# Patient Record
Sex: FEMALE | Race: WHITE
[De-identification: names, ages, dates, MRNs, and addresses within clinical notes are randomized per-mention and may not be internally consistent; named-entity substitution may affect disease eponyms.]

---

## 2021-01-25 ENCOUNTER — HOSPITAL ENCOUNTER (INPATIENT)
Dept: HOSPITAL 46 - MS | Age: 76
LOS: 17 days | Discharge: NURSING, REHAB OR LONG-TERM CARE | DRG: 470 | End: 2021-02-11
Attending: SPECIALIST | Admitting: SPECIALIST
Payer: MEDICARE

## 2021-01-25 VITALS — HEIGHT: 63 IN | WEIGHT: 134.48 LBS | BODY MASS INDEX: 23.83 KG/M2

## 2021-01-25 DIAGNOSIS — Z79.1: ICD-10-CM

## 2021-01-25 DIAGNOSIS — M16.11: Primary | ICD-10-CM

## 2021-01-25 DIAGNOSIS — Z79.899: ICD-10-CM

## 2021-01-25 DIAGNOSIS — I10: ICD-10-CM

## 2021-01-25 DIAGNOSIS — F17.210: ICD-10-CM

## 2021-01-25 DIAGNOSIS — G62.9: ICD-10-CM

## 2021-01-25 DIAGNOSIS — Z79.891: ICD-10-CM

## 2021-01-25 PROCEDURE — C1776 JOINT DEVICE (IMPLANTABLE): HCPCS

## 2021-01-25 PROCEDURE — C1713 ANCHOR/SCREW BN/BN,TIS/BN: HCPCS

## 2021-01-27 NOTE — NUR
DOS:02/08/21
HAS 1 STEP INTO THE HOME
WALK IN SHOWER  AND CAN GET A SHOWER CHAIR
WILL GET A FWW
HAS THE [facility]
HER  WILL GET HER TO PHYSICAL THERAPY APPOINTMENTS AND THE [employer] [address]
WILL BRING HER TO DR BEARD APPOINTMENTS.
PT IS CONCERNED THAT SHE IS NOT GOING TO DO WELL AT HOME. THING SHE MAY NEED
TO GO TO THE [facility] FOR MORE REHAB.

## 2021-01-28 NOTE — NUR
PT CALLED WRITER THIS AM AND ASKED IF SHE COULD RECEIVE HER 1ST COVID
INJECTION THIS WEEK. DR BEARD NOTIFIED AND SAID NO. PHONE CALL TO PATIENT AND
EXPLAINED THAT SHE NEEDS TO WAIT TO RECEIVE HER COVID VINJECTION. PT WAS OKAY
WITH THIS. SHE WANTS HER JOINT FIX.

## 2021-02-08 PROCEDURE — 8E0YXBZ COMPUTER ASSISTED PROCEDURE OF LOWER EXTREMITY: ICD-10-PCS | Performed by: SPECIALIST

## 2021-02-08 PROCEDURE — 0SR904Z REPLACEMENT OF RIGHT HIP JOINT WITH CERAMIC ON POLYETHYLENE SYNTHETIC SUBSTITUTE, OPEN APPROACH: ICD-10-PCS | Performed by: SPECIALIST

## 2021-02-08 NOTE — NUR
THIS RN IN TO ASSESS PT AND ADMINISTER ORDERED MEDICATIONS. PT LAYING IN BED
SLEEPING. PT AWAKES EASILY AND STATES THAT SHE IS STILL HAS 8/10 RIGHT HIP
PAIN. ORDERED IV KETOROLAC ADMINISTERED ALONG WITH OTHER MEDICATIONS (SEE
MAR). PT ALSO PLACED ON 2L O2 NC DUE TO PT SPO2 AT 86% WHILE SLEEPING. VS
STABLE, DRESSING INTACT AND C/D/I. SCDS, AND CRYO CUFF IN PLACE. PT REPORTS NO
FURTHER NEEDS WHEN ASKED. CALLL LIGHT IN REACH, BED IN LOWEST POSITION, WILL
CONTINUE PLAN OF CARE.

## 2021-02-08 NOTE — NUR
Called and spoke with Day from [facility].  They are
accepting patients.  She request face sheet and any notes to check for
payment.  Face sheet, surgery report, and H&P faxed to [place].

## 2021-02-08 NOTE — NUR
PATIENT IN BED RESTING. PT IN ROOM TO TALK TO PATIENT. CRYO FILLED. CALL LIGHT
IN REACH. NO FURTHER NEEDS AT THIS TIME.

## 2021-02-08 NOTE — NUR
ASSESSMENT COMPLETE. PT LAYING IN BED LEG ELEVATED. SCHEDULED MEDICATION
ADMINISTERED. PT REPORT PAIN LEVELS AS 10/10 SINCE BLOCK WAS IN PLACE. PT
ADMITS THAT CALF UP IN NUMB AND THE BLOCK HELPED BUT STILL EXPERIENCING PAIN
THAT IS UNMANAGABLE WITH PAIN REGIMEN. PRN PAIN MEDICATION CAN NOT BE
GIVEN FOR AN HR. PT NOTIFIED.SCD'S IN PLACE, CYROCUFF IN PLACEAND CHECKED
 DRESSINGS CLEAN DRY AND INTACT. CALL LIGHT WITHIN REACH

## 2021-02-08 NOTE — NUR
PT TO FLOOR VIA BED WITH GOOD FROST. PT AWAKE AND DENIES PAIN OR NAUSEA. PT CAN
WIGGLE TOES AND HAS SENSATION TO ANKLE. SCDS IN PLACE. DRESSING CDI X2. GIVEN
WATER AND JELLO. CALL LIGHT IN REACH. VS STABLE.

## 2021-02-08 NOTE — NUR
Pt states she lives in [place] with her spouse in an apartment.
Spouse will assist her as needed.  Pt states she has pain, different
from her last hip replacement.  She initially thought she would go
home with home health, but now feels she will need placement.  First
choice would be to go to [facility] program.
Second choice is [facility] in [place].  Informed I will contact
[place] to see if they are accepting patients.  Will follow up with pt
tomorrow.

## 2021-02-08 NOTE — NUR
PT LAYING IN BED. HEAD ELEVATED TO 15 DEGREES. LIGHT TURNED DOWN. PT REPORTS
NO PAIN IF SHE STAY STILL BUT IF SHE MOVED PAIN IS 8/10. NO CONCERNS. CALL
LIGHT WITHIN REACH.

## 2021-02-08 NOTE — NUR
SCHEDULE PAIN MEDICATION GIVEN. ASSISTED PT UP TO THE BATHROOM. 1-2 PERSON
ASSIST W WALKER. BED MADE. ASSISTED PT BACK TO BED. CRYOCUFF IN PLACE, FOOT
AIR CUFFS REPLACED. PT REPORTS PAIN AS A 8/10.  PT RESTING IN BED, BED
ELEVATED TO 15 . PILLOW PLACED NEXT TO HIP. NO OTHER CONCERNS. CALL LIGHT
WITHIN REACH

## 2021-02-08 NOTE — NUR
PT RECIEVED A BLOCK FROM BLAYNE ZIMMER. REPORTED PAIN AT A 10/10 BEFORE
PROCEDURE. AFTER PROCEDURE PT REPORTS PAIN AS A 7/10. PT EATING LUNCH. HEAD
ELEVATED, CRYOCUFF REPLACED. SPIKA IN PLACED. [facility], FOOT PUMPS IN PLACE
AND ON. PT REQUESTED COFFEE.PT REPOSITIONED. NO OTHER CONCERNS. CALL LIGHT
WITHIN REACH.

## 2021-02-08 NOTE — NUR
PT REPORTS PAIN OF 10/10 ON RT GLUTIAL MUSCLE. REPORTS DEEP NOT SURFACE PAIN.
PRN PAIN MEDICATION ADMINISTERED. PT REPOSITIONED. CALL LIGHT WITHIN REACH.

## 2021-02-08 NOTE — NUR
REPORT RECEIVED FROM HELEN FUNK. PT LAYING IN BED WHEN ASKED ABOUT PAIN PT
STATES SHE'S AT AN 8/10 ON HER R HIP. PT STATES SHE IS STILL ABLE TO RELAX.
CRYO, SCD'S, COMPRESSION STOCKINGS IN PLACE. SPICA DRESSING ON AND IS C/D/I
WITH NO SIGNS OF BLEEDING. CRYO CUFF REFILLED AT THIS TIME. PHONE CALL TO
[facility]IS[facility] BY [facility]. NEW ORDERS PLACED. WILL CONTINUE PLAN OF CARE.

## 2021-02-08 NOTE — NUR
CALLED DR BEARD REGARDING NEWLY NOTED SWELLING AT INCSION SITE. DRESSING
OTHERWISE CLEAN AND DRY. ORDERED A SPICA DRESSING. GIVEN OXY AND PLACED SPICA
DRESSING WO DIFFICULTY. PT RATES PAIN 10\10 AND STATES THAT HER LAST HIP DID
NOT HURT LIKE THIS. CRYO BACK IN PLACE.

## 2021-02-08 NOTE — NUR
THIS RN IN TO ADMINISTER ORDERED IV ABX AND KETOROLAC. PT SLEEPING IN BED AND
WOKE EASILY. PT IN NO APPARENT DISTRESS BUT RATES HER PAIN AT A 7/10. WHEN
ASKED IF PT IS ABLE TO RELAX AND REST PT STATES YES. PT STATES PREVIOUS PAIN
MEDICATIONS HELPED WITH THE PAIN. PT STILL ON 2L O2 NC, SPO2 AT 94%. PT
REPORTS NO FURTHER NEEDS WHEN ASKED IV ABX INFUSING. CALL LIGHT IN REACH, BED
IN LOWEST POSITION, WILL CONTINUE PLAN OF CARE.

## 2021-02-08 NOTE — NUR
THIS RN IN TO PT'S ROOM TO ADMINISTER ORDERED PAIN MEDICATIONS. PT SLEEPING IN
BED AND AWOKE EASILY. WHEN ASKED TO RATED HER PAIN PT STATED IT WAS AT A 7/10.
PT ALSO GIVEN PRN OXYCODONE ALONG WITH SCHEDULED PAIN MEDICATIONS AT THIS
TIME. PT REPORTS NO DIFFICULTY BREATHING, SPO2 AT 95%, PT STILL ON 2L O2 NC.
PT REPORTS NO FURTHER NEEDS WHEN ASKED, WILL CONTINUE PLAN OF CARE. CALL LIGHT
IN REACH, BED IN LOWEST POSITION.

## 2021-02-08 NOTE — NUR
DR BEARD CALLED AND UPDATED ON PTS POOR PAIN MANAGEMENT. NEW ORDERS RECIEVED.
READ BACK FOR VERIFICATION. [facility] SHIFT NURSES NOTIFIED.

## 2021-02-09 NOTE — NUR
PT ASSISTED TO CHAIR FOR DINNER. PICTURE ON LEFT HAND SKIN TEAR TAKEN. CONCENT
SIGNED AND PLACED IN CHART.

## 2021-02-09 NOTE — NUR
THIS RN IN TO ADMINISTER ORDERED MEDICATIONS. PT AWAKE AND ALERT AND HAD JUST
HAD HER LABS DRAWN. PT REPORTS A 7/10 PAIN. 5MG PRN OXYCODONE GIVEN ALONG WITH
PTS SCHEDULED PAIN MEDICATIONS. CRYO CUFF REFILLED WITH ICE AT THIS TIME BY [facility] CU[facility]. PT REPORTS NO FURTHER NEEDS AT THIS TIME AND STATES SHE WILL BE
WATCHING TV. CALL LIGHT WITHIN REACH, BED IN LOWEST POSITION, WILL CONTINUE
PLAN OF CARE.

## 2021-02-09 NOTE — NUR
THIS RN IN TO ADMINISTER ORDERED MEDICATIONS. PT AWAKE AND ALERT WATCHING TV.
PT STATES THAT HER PAIN RIGHT HIP/LEG PAIN HAS IMPROVED AND IS NOW AT A 4/10.
PT GIVEN SCHEDULED MEDICATIONS. PT REPORTS NO FURTHER NEEDS WHEN ASKED, WILL
CONTINUE PLAN OF CARE. CALL LIGHT IN REACH, BED IN LOWEST POSITION, WILL
CONTINUE PLAN OF CARE.

## 2021-02-09 NOTE — NUR
PT FELL EARLIER THIS AFTERNOON. THIS RN WAS DOWN THE JAMA AND HEARD CALL LIGHT
GO OFF AND SOMEONE YELL. RAN TO ROOM AND PT WAS ON FLOOR IN BATHROOM. [facility] N[employer] AND STUDENT RN LESTER IN ROOM. PT STATED HER ANKLE AND OPERATIVE HIP
WERE THE ONLY THINGS HURTING. SMALL SKIN TEAR RIGHT HAND. VS ASSESSED. NO
DEFORMITLY NOTICED. OBTAINED HOVER AND LAINA MATS. MOVED PT TO BED WO
DIFFICULTY. PT THEN STATED THAT IT FELT BETTER ONCE SHE SETTLED DOWN IN THE
BED. STATED SHE WAS MOSTLY SCARED. VS REASSESSED AND CYRIL CALLED KISHA AND
ORDERED AN XRAY. IRIS PLACED.

## 2021-02-09 NOTE — NUR
IV ABX COMPLETE, PT SALINE LOCKED. PT STILL REPORTS 7/10 PAIN BUT STATES HER
PAIN MEDICATIONS HAVE HELPED WITH HER COMFORT. PT REPORTS NO FURTHER NEEDS
WHEN ASKED AND IS WATCHING TV. CALL LIGHT IN REACH, BED IN LOWEST POSITION,
WILL CONTINUE PLAN OF CARE.

## 2021-02-09 NOTE — NUR
REPORT RECEIVED. PT LYING IN BED WITH EYES CLOSED. RR EQUAL AND NON-LABORED.
CRYOCUFF, TEDHOSE, FEET PUMPS ALL IN PLACE. SALINE LOCK. CALL LIGHT IN REACH.

## 2021-02-09 NOTE — NUR
ASSESSMENT DONE. TOOK PICTURES OF SKIN TEAR ON PT RT HAND. ASSISTED PT TO
RESTROOM WITH 1 PERSON STAND BY WITH WALKER. PT SITTING IN CHAIR WITH LETGS
ELEVATED. REPORTS PAIN A 6/10. TEB HOSES, AND CYROCUFF IN PLACE. NO CONCERNS
AT THIS TIME. CALL LIGHT WITHIN REACH.

## 2021-02-09 NOTE — NUR
THIS RN IN TO CHECK ON PT. PT SLEEPING IN BED ON 2L NC, SPO2 AT 95%. PT
RESPIRATIONS ARE EVEN AND UNLABORED. PT IN NO APPARENT DISTRESS AT THIS TIME
AND WAS LEFT UNDISTURBED, WILL CONTINUE PLAN OF CARE.

## 2021-02-09 NOTE — NUR
ROUNDED ON PT. PASSED MEDICATIONS WITH STUDENT NURSE. PT ASSISTED TO EAT
BREAKFAST IN CHAIR. PAIN REPORTED AT 7/10. SCHADULED TORADOL ADMINSTERED.
ASSESSMENT COMPLETED. SWELLING NOTED TO RIGHT THIGH. PT REPORTING NUMBNESS TO
RIGHT ANTERIOR THIGH. SPIKA IN PLACE, DRESSING ASSESSED AND IS C/D/I. DR BEARD
TO BEDSIDE FOR ROUNDS. PLAN OF CARE DISCUSSED. TEDHOSE AND CRYOCUFF IN PLACE.

## 2021-02-09 NOTE — NUR
PATIENT SITTING IN CHAIR EATING DINNER. CRYO FILLED. FRESH WATER GIVEN. VITALS
AND I&O'S CHARTED. CALL LIGHT IN REACH. NO FURTHER NEEDS AT THIS TIME.

## 2021-02-09 NOTE — NUR
THIS RN IN TO ASSESS PT AND ADMINISTER SCHEDULED MEDICATIONS. PT INITIALLY
SLEEPING BUT WOKE EASILY. PT WAS ALERT AND ORIENTED. PT HELPED UP TO BSC AS
SHE HAD TO VOID. PT REQUIRED ASSISTANCE TO GET OUT OF BED AS PT STATES HER R
LEG IS STILL "NUMB" FROM THE BLOCK. PT PIVOTED TO BSC WITH 2 PERSON ASSIST AND
ASSISTED BACK ONTO THE BED AS WELL. DRESSING ON R HIP SHOWS NO SIGNS OF
DRAINAGE/BLEEDING AND IS C/D/I. SPICA DRESSING STILL IN PLACE OVER DRESSING.
PEDAL PULSES STRONG BILATERALLY. PT STATES SHE IS COMFORTABLE BUT STILL
REPORTS A 7/10 PAIN. ORDERED TRAMADOL GIVEN. PRN OXYCODONE OFFERED BUT PT
DENIES NEEDING FOR NOW. COMPRESSION STOCKINGS STILL IN PLACE, SCD'S PLACED
BACK ON, PT SALINE LOCKED. PT REPORTS NO FURTHER NEEDS WHEN ASKED AND STATES
SHE WILL BE GOING BACK TO SLEEP. CALL LIGHT ON BED WITHIN REACH, BED IN LOWEST
POSITION, WILL CONTINUE PLAN OF CARE.

## 2021-02-09 NOTE — NUR
PATIENT IN BED. CRYO FILLED. FRESH WATER GIVEN. VITALS AND I&O'S CHARTED. CALL
LIGHT IN REACH. NO FURTHER NEEDS AT THIS TIME.

## 2021-02-09 NOTE — NUR
ASSISTED PT UP TO CHAIR FOR BREAKFAST.
PT REPORTS PAIN AT 7/10. REPORTS FEELING
BETTER TODAY. 1 PERSON STAND BY ASSIST W WALKER. NO CONCERNS CALL LIGHT WITHIN
REACH.

## 2021-02-09 NOTE — NUR
PT BROUGHT STAND BY TO BATHROOM. LEFT ON TOILET ALAONE WITH DOOR CLOSED. PT
TOLD TO PULL CALL LIGHT WHEN DONE. PT REPORTS SHE WAS LEANING FORWARD ON
TOILET AND DID NON STAND UP ALONE. SHE REPORTS HER RIGHT ANKLE TWISTED D/T
STILL BEING NUMB AND SHE FELL OFF TOILET ONTO RIGHT HIP WITH FWW LEG UNDER
RIGHT LEG. PT SCREAMED OUT AND STUDENT NURSE OUTSIDE DOOR WENT IN TO FIND PT
DOWN. CALL LIGHT PULLED AND STAFF RESPONDED. HOVER GABRIEL USED TO GET PT OUT
OFF BATHROOM AND TO LAINA LIFT. PT LIFT UP IN BED. MANUAL BLOOD PRESSURE WHILE
DOWN /90. PT REPORTED PAIN IN RIGHT HIP AND RIGHT ANKLE. PT WAS CRYING
WHILE ON FLOOR FROM FEAR THAT THE FALL HURT HER HIP. EMOTIONAL SUPPORT AND
REASSURANCE PROVIDED. ONCE PT WAS IN BED HER PAIN DECREASED AND SHE DENIED
PAIN IN HER ANKLE. VITALS RECHECK :BP-164/84 HR-70 RR-16 TEMP 98 OXYGEN 98% ON
RA. NEW ABRASION TO RIGHT HAND. NO OTHER VISIBLE INJURIES.
RIGHT HIP X-RAY DONE AND DR BEARD NOTIFIED. DR CAMARA ALSO AWARE. NO OTHER
ORDER FROM DOCTOR.

## 2021-02-09 NOTE — NUR
THIS RN IN TO ADMINISTER ORDERED ABX. PT SLEEPING AND WOKE UP WHEN ENTERING
ROOM. IV ABX STARTED AT THIS TIME. PT REPORTS NO NEEDS AT THIS TIME AND IS NOW
WATCHING TV. CALL LIGHT IN REACH, BED IN LOWEST POSITION, WILL CONTINUE PLAN
OF CARE.

## 2021-02-09 NOTE — NUR
REPORT RECEIVED FROM HELEN RN. PT LAYING IN BED AWAKE AND ALERT. SCDS IN
PLACE, CRYO CUFF CONTAINS ICE AND IS IN PLACE. PT REPORTS NO NEEDS WHEN ASKED.
CALL LIGHT IN REACH, BED IN LOWEST POSITION, WILL CONTINUE PLAN OF CARE.

## 2021-02-09 NOTE — NUR
IN TO ADMINSTER SCHEDULED TRAMADOL AND GABIPENTIN. PT TALKING ON [place] WITH
 SITTING UP IN CHAIR. PT IS REPORTING PAIN AS 7/10. PHYSICAL THERAPY IN
TO WORK WITH PT. JADIELS FURTHER NEEDS.

## 2021-02-09 NOTE — NUR
Notified by charge nurse, pt had a near fall while working with PT and later
fell in her room.  Will speak with Dr. Khanna about this in the am to plan for
discharge for this patient.

## 2021-02-09 NOTE — NUR
Met with Amalia regarding her care and patient experience while here in the
hospital.  Pt states that she is happy with her care, she does c/o having a
lot of pain in her surgical hip, but that it is better since they did the
block and received pain medications.  She is quite concerned about going home
at this time, she does not feel comfortable with her  caring for her at
home and desires to be able to be able to do more of her self care prior to
going home.  Pt also states that she cannot get up and walk at this time as
her leg is still numb from the block that she received for the ongoing pain.
She admits she is numb now, but without the current block she did have a "lot"
of pain, more than she expected or felt with her previous hip replacement.
This patient is very pleasant to visit with, and she is oriented to person,
place and time, she also does answer questions appropriately.

## 2021-02-09 NOTE — NUR
PATIENT IN CHAIR WATCHING TV. LINENS CHANGED. CRYO CHECKED. WASH CLOTH GIVEN.
VITALS AND I&O'S CHARTED. CALL LIGHT IN REACH. NO FURTHER NEEDS AT THIS TIME.

## 2021-02-09 NOTE — NUR
THIS RN IN TO ASSESS PT AND GIVE SCHEDULED MEDICATIONS. PT AWAKE AND ALERT AND
COMPLAINS OF A 7/10 PAIN. SCHEDULED MEDICATIONS GIVEN ALONG WITH 10MG PRN
OXYCODONE. PT ASSESSED, DRESSING ON R HIP IS C/D/I, SPICA DRESSING IN PLACE.
DRESSING ON L SIDE IS ALSO C/D/I. PULSES STRONG AND CAPILLARY REFILL IS UNDER
3 SECONDS. PT NEEDED TO VOID AND WAS HELPED UP TO THE BATHROOM. PT ASSISTED
OUT OF BED AND ABLE TO USE FRONT WHEEL WALKER WITHOUT ASSISTANCE. PT ASSISTED
OFF OF TOILET AND ALSO ASSISTED BACK INTO BED. PT STATES THAT SHE IS
BEGGINNING TO FEEL MORE ON HER R LEG STATING SHE CAN FEEL FROM THE KNEE DOWN.
PT REPORTS NO FURTHER NEEDS ONCE BACK INTO BED. IV PATENT, CRYO CUFF REFILLED
WITH ICE AND IN PLACE, SCD'S ON FEET. BED IN LOWEST POSITION, CALL LIGHT IN
REACH, WILL CONTINUE PLAN OF CARE.

## 2021-02-09 NOTE — NUR
SCHEDULED PAIN MEDICAITONS ADMINISTERED. PHYSICAL THERAPY IN TO AMBULATE PT.
LAZARUS POWELL REPLACED. PT NOW OUT AMBULATING HALLS W/O ISSUES.

## 2021-02-09 NOTE — NUR
PT SITTING UP IN CHAIR WITH LEGS ELEVATED. CYROCUFF IN PLACE. CHECKED
DRESSING C/D/I. TEETH BRUSHED. LUNCH ORDERED.  JUST FINISHED WORKING W
PHYSICAL THERAPY. NO CONCERNS. CALL LIGHT WITHIN REACH.

## 2021-02-09 NOTE — NUR
PT SLEPT THROUGH MOST OF NIGHT. PT WAS RECEIVING SCHEDULED PAIN MEDICATIONS
AND PRN OXYCODONE. DRESSING SHOWS NO SIGNS OF BLEEDING AND IS C/D/I. SPICA
DRESSING IN PLACE. CRYO CUFF IN PLACE ON R HIP, SCDS AND STOCKINGS ON. PT
COMPLAINS OF CONSISTENT 7/10 PAIN BUT STATES SHE FEELS COMFORTABLE WITH HER
PAIN MEDICATIONS. PT R LEG STILL NUMB FROM BLOCK. PT NEEDS ASSISTANCE TO GET
OUT OF BED TO USE BEDSIDE COMMODE EVEN WITH FRONT WHEEL WALKER. PT SALINE
LOCKED AT THIS TIME.

## 2021-02-09 NOTE — NUR
Received call from Caro at [facility].  She is requesting
the chart and a Face to Face. Called and updated, I am unsure if pt will need
Transitional care or Home Health on Discharge.  I will let them know later in
the week, depending on how Amalia does with PT.

## 2021-02-09 NOTE — NUR
IN FOR MEDICATION PASS. PAIN REPORTED AT CONSISTANT 7/10.
PT RELAXED IN BED, WATCHING TV TALKING ON PHONE. TEDHOSE, CRYOCUFF, HEEL PUMPS
ALL IN PLACE.

## 2021-02-09 NOTE — NUR
PT SITTING IN CHAIR JUST FINISHED LUNCH. PT REQUESTED TO USE THE BATHROOM.
NURSE STUDENT ASSISTED PT TO THE BATHROOM USING THE WALKER.  TOLD PT TO CALL
WHEN DONE THAT NURSE WOULD BE RIGHT OUTSIDE THE DOOR. [facility] STUDENT CLOSED THE
DOOR TO GIVE THE PT PRIVACY. PT SCREAM AND NURSE STUDENT OPENED THE DOOR AND
PT WAS ON THE FLOOR. PT STATED SHE TRIPPED ON HER FOOT WHILE LEANING OER AND
THAT SHE WAS NOT TRYING TO GET UP.  NURSE CYRIL AND PATTIE CAME IN AND
ASSISTED PT BACK TO BED USING A LAINA MAT AND LAINA. DR. BEARD NOTIFIED AND
ORDERED AN XRAY. IMAGING CAME AND XRAYED PT RT HIP. ADMINISTER PRN OXY. PT IN
BED. HEAD ELEVATED. CALL LIGHT WITHIN REACH.

## 2021-02-09 NOTE — NUR
PT SITTING IN CHAIR, ASSISTED PT TO THE BATHROOM 1 PERSON ASSIST WITH WALKER.
ADMINISTERED SCHEDULED MEDS. DR BEARD VISITING WITH PT. REPORTS PAIN 7/10.
GAVE SCHEDULED TORADOL. CYROCUFF IN PLACE.ASSESSMENT COMPLETE. CALL LIGHT
WITHIN REACH.

## 2021-02-10 NOTE — NUR
PATIENT WAS UP IN BED, THIS CNA TOOK PATIENTS BREAKFAST ORDER, GAVE A WARM
WASH CLOTH AND PATIENT SAID SHE WOULD CALL IF SHE NEEDED ANY OHER ASSISTANCE

## 2021-02-10 NOTE — NUR
Spoke with Amalia.  She states she cont. to not have feeling in her leg.  She
would like to go to TC at [facility].  I did speak with Day earlier
and faxed updated notes.  She felt they could accept pt to their program
tomorrow and she would call later today or tomorrow am.

## 2021-02-10 NOTE — NUR
ROUNDING
pt was asleep in bed, woke to my greeting. pts lunch tray beside her. pt sat
up in bed, semi fowlers, to eat her lunch. pts right hip surgical dressing
assessed, still CDI. pt still unable to feel her right upper leg. pt still has
feeling in her right lower leg and foot and in the abdominal area above her
surgical site, but nowhere between. pt denies pain and nausea at this time. pt
in bed, having lunch, table and call light within reach.

## 2021-02-10 NOTE — NUR
ASSESSMENT + MED PASS
pt assessment complete, VSS. pt reports 5/10 tolerable pain and no nausea at
this time. pt dressing CDI and cryo cuff active and applied. pt able to take
all meds as ordered without difficulty. pt denies further needs at this time.
table and call light within reach. pt back to bed.

## 2021-02-10 NOTE — NUR
IN ROOM TO ASESS PT AND ADMINISTER MEDS. DRESSING ON R HIP IS INTACT-SEE
ASSESSMENT. PT WALKED DOWN TO END OF JAMA AND BACK SBA WITH FWW. SHE IS BACK
IN BED WITH ALL ORDERED DEVICES IN PLACE AND NEW ICE IN CRYOCUFF. VS AND I&O'S
ENTERED. PT IS DOING WELL ON SCHEDULED PAIN MEDS. PT DENIES FURTHER NEEDS AND
CALL LIGHT IS CLOSE.

## 2021-02-10 NOTE — NUR
PT SLEPT THROUGH MOST OF THE NIGHT. PT DRESSING ON R HIP C/D/I, DRESSING ON L
SIDE C/D/I. PT REPORTS PAIN VARYING FROM 6/10 TO 8/10 BUT IS ABLE TO BE
COMFORTABLE. PT STATES SHE IS BEGINNING TO GAIN MORE SENSATION AND CONTROL OF
HER LOWER RIGHT LEG. SCD'S ON, CRYO CUFF IN PLACE. PT VOIDING Q/S AND IS
SALINE LOCKED. PT ABLE TO AMBULATE TO BATHROOM WITH 1 PERSON ASSIST USING FWW.

## 2021-02-10 NOTE — NUR
PT ALERT, ORIENTED AND PLEASANT TO VISIT WITH. PT SHARED THAT [facility] IS HER FIRST
CHOICE IN HOSPITALS IN THE AREA. FEELS INFORMED AND DID MENTION THAT THIS
JOINT REPLACEMENT IS DIFFERENT THAN HER OTHER. RN LINDSAY IN CARING FOR PT WITH
PAIN MEDS. GAVE THOMAS AND ARLENEPOST. WILL FOLLOW

## 2021-02-10 NOTE — NUR
MED PASS + ASSESSMENT
pt assessment completed, VSS, abdominal site CDI and dressing was removed
before assessment, unsure by who. pts hip dressing is CDI. pt reports 6/10
pain at this time. pt able to take all meds as ordered without difficulty. pt
denies nausea at this time. pt able to get up to bathroom to void and is back
to chair at this time. pt denies further needs at this time. table and call
light within reach.

## 2021-02-10 NOTE — NUR
ROUNDING
pt lying in bed, eyes closed, breathing even and unlabored, table and call
light within reach.

## 2021-02-10 NOTE — NUR
Spoke with Amalia while she is walking with PT.  She's
walking with a walker, and
PT is pulling a wc.  Pt states her leg remains numb to her knee. Spika
dressing was removed today. Discussed dc plan with pt and she cont. to want to
go to [facility].  I will contact  to see if they cont. to have beds.

## 2021-02-10 NOTE — NUR
PATIENT WAS IN BED, PATIENTS VITALS AND INTAKE AND OUTPUT WERE DONE, PATIENT
NEEDEDNO OTHER ASSISTANCE AND WANTED TO REST CALL LIGHT IS WITHIN REACH

## 2021-02-10 NOTE — NUR
MED PASS
pt able to take med as ordered. pt reports 5/10 pain in her hip. dressing
still CDI. pt in bed, table and call light within reach.

## 2021-02-10 NOTE — NUR
THIS RN IN TO GIVE PT SCHEDULED MEDICATIONS. PT ASLEEP AND AWOKE EASILY. PT
ORIENTED. PT RATES PAIN AT RIGHT HIP AS A 6/10. SCHEDULED MEDICATIONS
ADMINISTERED. PT REPORTS NO FURTHER NEEDS WHEN ASKED, FRESH ICE WATER
PROVIDED, BED IN LOWEST POSITION, CALL LIGHT IN REACH, WILL CONTINUE PLAN OF
CARE.

## 2021-02-10 NOTE — NUR
ROUNDING
pt denies needs at this time and reports 5/10 tolerable pain. pt up to chair,
watching TV, table and call light within reach.

## 2021-02-10 NOTE — NUR
THIS RN IN TO CHECK ON PT. PT LAYING IN BED SLEEPING, RESPIRATIONS EVEN AND
UNLABORED. PT IN NO APPARENT DISTRESS AND WAS LEFT UNDISTURBED. WILL CONTINUE
PLAN OF CARE.

## 2021-02-10 NOTE — NUR
This pt being seen post right hip replacement and follow up for HTN.
pt is on round the clock pain meds, and was able to take all meds today
without difficulty. pt reports between 5-7/10 pain, never dropping below 5/10.
pt denied nausea throughout the shift and has been working with P.T. and up to
the bathroom several times today with SBA + FWW. pt has not had a BM in many
days and is getting miralax and senokot daily, still no BM as of this evening.
pt hip dressing has been CDI all day, cryo cuff refilled with ice around 1800.
pt tolerating her regular diet, eating well, and voiding quantity sufficient.
anticipating DC tomorrow to another facility, likely [facility] or [facility].

## 2021-02-10 NOTE — NUR
SHIFT REPORT FROM WILY FUNK INCLUDED:
pt had r hip replacement and is being monitored for HTN and post op needs. Day
2 post op, pt getting pain meds around the clock as needed and scheduled. pt
had an otherwise uneventful evening, lungs clear, voiding quantity sufficient.
pt currently resting in bed, eyes closed, breathing even and unlabored, table
and call light within reach.

## 2021-02-10 NOTE — OR
[facility]
                                    [address] [place], [place]  [address]
_________________________________________________________________________________________
                                                                 Signed   
 
 
DATE OF OPERATION:
02/08/2021
 
SURGEON:
Eliot Khanna MD
 
PREOPERATIVE DIAGNOSIS:
Severe degenerative joint disease, right hip.
 
POSTOPERATIVE DIAGNOSIS:
Severe degenerative joint disease, right hip.
 
PROCEDURE PERFORMED:
Right total hip arthroplasty with Jose Francisco.
 
ASSISTANT:
Lisa Hernández PA-C.  Lisa was present and critical for all portions of
procedure. 
 
ANESTHESIA:
Spinal.
 
BLOOD LOSS:
150 mL.
 
IMPLANTS:
Arcadia Accolade 3 stem, high offset with a 48 mm cup, a standard liner and a +2.5 mm
head. 
 
BRIEF HISTORY:
Svetlana is a 75-year-old female with progressive worsening of arthritis in the right hip.
She had undergone left total hip about two years ago and loved it.  She wished to
proceed with the right.  Risks and benefits of operative treatment were discussed with
her and she elected to proceed. 
 
DESCRIPTION OF PROCEDURE:
Once consent was obtained, she was taken to the operating room.  After adequate
anesthesia, she was placed on operating room table in supine position on the Bone Foam
hip positioner.  All downside pressure points were well padded.  Both lower extremities
were prepped and draped to the ribcage.  The ASIS was prepped out into the fields.  The
incision was marked out for the anterior approach on the right side.  The ASIS was then
found on the left side and three fingerbreadths posterior to that, the first pin for the
 
    Electronically Signed By: ELIOT KHANNA MD  02/10/21 0845
_________________________________________________________________________________________
PATIENT NAME:     SVETLANA MICHELLE                       
MEDICAL RECORD #: [id number]            OPERATIVE REPORT              
          ACCT #: [id number]  
DATE OF BIRTH:   [birth year]            REPORT #: [id number]      
PHYSICIAN:        ELIOT KHANNA MD              
PCP:              NO PRIMARY CARE PHYSICIAN     
REPORT IS CONFIDENTIAL AND NOT TO BE RELEASED WITHOUT AUTHORIZATION
 
 
                                  [facility]
                                    [address], [place]  [address]
_________________________________________________________________________________________
                                                                 Signed   
 
 
Jose Francisco computer ray was placed.  The other two pins were placed posterior to this
percutaneously.  Excellent purchase was obtained.  We then made the approach to the hip
through a longitudinal 10 cm incision.  This was carried through skin and subcutaneous
tissue and down on the fascia.  Fascia was divided along the anterior edge of the IT
band.  The interval between the gluteus medius and the tensor fascia wali was then
developed with finger dissection.  This was taken down to the femoral neck and the fat
pad anterior to the capsule.  The first retractor was placed around the inferior femoral
neck, second was placed superior and the fat pad was removed.  The vastus was then
elevated off the anterior acetabulum.  Once we had accomplished this, then the capsule
was T'd and an anterior capsulectomy was made.  The labrum and anterior soft tissues
were removed around the acetabulum.  The inferior capsule and scar tissue were released
down the femoral neck to the level of the lesser trochanter.  Once we accomplished this,
both retractors were placed inside the capsule around the femoral neck.  The femoral
neck cut was then marked out 13 mm above the lesser trochanter and a 2nd neck cut was
made about a cm proximal to that.  These neck cuts were then made and the napkin ring
was removed as was the femoral head, it came out quite easily.  The remaining soft
tissue around the acetabulum was removed as was the __________.  Prior to this, we did
place checkpoint in the greater trochanter and registered the femur with the computer.
The fine anatomic points of the acetabulum were then registered with the computer.  Once
that was satisfactory, we brought in the robot with reamer and reamed first with a 44,
then a 48.  The cup was then obtained, placed into the acetabulum and again using the
robot 44 of abduction and 20 of anteversion, the cup was impacted until it was
well-seated.  The bone was a little bit soft, so I went ahead and put a screw in the
posterior superior quadrant.  The acetabular liner was impacted.  Attention was then
turned to proximal femur.  The capsule was released off the undersurface of the greater
trochanter and the femur __________ position.  The femur was opened by the cookie cutter
followed by the curette.  We then broached up to a 3 leaving the 3 broach in place.  The
high offset neck and posterior head were placed initially, this was a little bit loose,
so we elected to go with a +2.5.  The trial was then removed and the final stem was
impacted until it was well seated and stable position.  The +2.5 head was then impacted
onto and again, the hip was reduced with ease.  Leg lengths found to be equal.  Using
the computer, we were able to check and she had 1 mm greater length on the opposite
side, which was our preop plan.  The wound was then copiously irrigated with antibiotic
solution, closed with #2 Stratafix for the fascia layer and #1 Stratafix for the
subcutaneous layer and 3-0 Monocryl and Steri-Strips for the skin.  The wound was
dressed with an Acticoat dressing and she was awakened and taken to the recovery room in
satisfactory condition.  All sponge, needle, and instrument counts were correct. 
 
 
 
            ________________________________________
            Eliot Khanna MD 
 
    Electronically Signed By: ELIOT KHANNA MD  02/10/21 0845
_________________________________________________________________________________________
PATIENT NAME:     SVETLANA MICHELLE                       
MEDICAL RECORD #: [id number]            OPERATIVE REPORT              
          ACCT #: [id number]  
DATE OF BIRTH:   [birth year]            REPORT #: [id number]      
PHYSICIAN:        ELIOT KHANNA MD              
PCP:              NO PRIMARY CARE PHYSICIAN     
REPORT IS CONFIDENTIAL AND NOT TO BE RELEASED WITHOUT AUTHORIZATION
 
 
                                  [address]
                                  [place]  [address]
_________________________________________________________________________________________
                                                                 Signed   
 
 
 
 
/[facility]
Job #:  [id number]
DD:  02/08/2021 09:09:55
DT:  02/08/2021 10:10:56
 
 
Copies:                                
~
 
 
 
 
 
 
 
 
 
 
 
 
 
 
 
 
 
 
 
 
 
 
 
 
 
 
 
 
 
 
 
 
 
    Electronically Signed By: ELIOT KHANNA MD  02/10/21 0845
_________________________________________________________________________________________
PATIENT NAME:     SVETLANA MICHELLE                       
MEDICAL RECORD #: [id number]            OPERATIVE REPORT              
          ACCT #: [id number]  
DATE OF BIRTH:   [birth year]            REPORT #: [id number]      
PHYSICIAN:        ELIOT KHANNA MD              
PCP:              NO PRIMARY CARE PHYSICIAN     
REPORT IS CONFIDENTIAL AND NOT TO BE RELEASED WITHOUT AUTHORIZATION

## 2021-02-10 NOTE — NUR
MED PASS
pt given her tramodol as ordered. pt able to take med without difficulty. pt
visiting with spiritual care at this time. table and call light within reach.

## 2021-02-10 NOTE — NUR
MED PASS
pt able to take med as ordered. pt denies needs at this time. table and call
light within reach.

## 2021-02-10 NOTE — NUR
PATIENT CALLED FOR ASSISTANCE TO PHIL ORTIZ WITH [facility]. THIS CNA STOOD BY AT DOOR.
PATIENT IN BED. CRYO CUFF ON RIGHT HIP, CALL LIGHT IN REACH

## 2021-02-10 NOTE — NUR
THIS RN IN TO ADMINISTER SCHEDULED PAIN MEDICATIONS. PT SLEEPING IN BED AND
AWOKE EASILY. PT REPORTS 6/10 IN HER RIGHT HIP DOWN TO HER KNEE. PT
MEDICATIONS ADMINISTERED. PT ASSESSED, DRESSINGS ON R HIP ARE C/D/I, SPICA
DRESSING IN PLACE. DRESSING ON L SIDE C/D/I. RIGHT HAND DRESSING IS C/D/I. PT
UP TO USE BATHROOM. PT ABLE TO GET UP WITH MINIMAL ASSISTANCE AND GO TO
BATHROOM WITH A FRONT WHEEL WALKER AND BACK. PT ASSISTED GETTING BACK INTO BED
BY ASSISTING WITH HER LEGS. PT NOW BACK IN BED SCD'S ON, CRYO CUFF REFILLED
WITH ICE AND BACK ON R HIP. VS TAKEN (SEE CHART). PT REPORTS NO FURTHER NEEDS
AT THIS TIME AND STATES SHE WILL BE GOING BACK TO SLEEP. BED IN LOWEST
POSITION, CALL LIGHT IN REACH, WILL CONTINUE PLAN OF CARE.

## 2021-02-11 NOTE — NUR
Spoke with Dr. Khanna, he is completing SNF orders fro [facility].  Spoke with Amalia and she still would like to go to the Swing Bed program
in [place].  Asked what time her  will pick her up and she states he
now cannot transport her.  Discussed transport per [facility] van and let
her know it will cost around $100 and she can pay by credit card.  Pt states
she is unable to pay this and does not have $100.
 
Called and spokew with Courtney Patel and she states the hospital will pay for the
ride.  I then called [facility], [facility], [facility], [facility], and
[facility].  All state they either have 0 drivers or are booked
for the day.
 
I called and spoke with Day from [place], their only transport is the [facility].
She gave me the number, I called and received a message stating due to Covid
they are not transporting their usual routes.  I left a message, but did not
receive a call back.

## 2021-02-11 NOTE — NUR
PT SLEPT WELL. HER PAIN IS CONTROLLED WITH SCHEDULED PAIN MEDS. SHE HAS BEEN
WEARING ALL ORDERED DEVICES. SHE IS TOLERATING A REG DIET. SHE AMBULATES SBA
WITH FWW. SHE WALKED IN THE [place] DURING THE NIGHT. SHE CONTINUES TO HAVE
NUMBNESS ON RIGHT BETWEEN PANTY LINE AND JUST BELOW THE KNEE.

## 2021-02-11 NOTE — NUR
I spoke with Courtney Patel again and voiced my concern we may lose the Swing
Bed in [place] if pt doesn't dc today. She suggested I call [facility] in
facilities.  His staff have at times, been able to transport pts.  I also
stated concern there is a winter storm warning and it started snowing at 0630
this morning.  I would like to get this pt to [place]2 [place]
 
I called Reg and he was able to call back and confirm they could transport
pt at 10:00.  Spoke with PT and asked if they would review precaustions with
pt and they will.
 
Called and spoke with Day at [facility].  Received Rn-Rn phone number and Doc
to Doc number.  Rn number given to Nicci Burnett and texted Doc number to Dr. Khanna
and asked him to please call.
 
Spoke with pt and she is happy with plans.  Asked if her spouse could take her
walker to [facility] and she states he won't as it is snowing.  Discussed
[facility] will have a walker she can use while she is there.  Wished her well.

## 2021-02-11 NOTE — NUR
SHIFT REPORT FROM NIRMALA RN INCLUDED:
pt had pain between 5-7/10 last night and needed no added PRN meds to cover.
pt had several loose BMs last night, and would like to not take her bowel meds
this morning. pt had an otherwise uneventful evening. pt currently in bed,
reports 2/10 pain and no nausea. table and call light within reach.

## 2021-02-11 NOTE — NUR
PT IS READY FOR DC, WAITING ON TRANSPORTATION PROVISIONS BEFORE SHE CAN FULLY
DC. PT DENIES PAIN AND NAUSEA AT THIS TIME. TABLE AND CALL LIGHT WITHIN REACH.

## 2021-02-11 NOTE — NUR
IN ROOM TO ADMINISTER SCHEDULED ULTRAM. PT ASKED TO USE THE RESTROOM. 1PA FWW
TO RESTROOM AND BACK TO BED. PT DENIES FURTHER NEEDS. CALL LIGHT IS CLOSE.

## 2021-02-11 NOTE — NUR
ADMINISTERED MORNING MEDICATIONS, PT RATES PAIN AT 5/10. SBA TO BATHROOM. PT
WILL CALL WHEN SHE IS DONE. CALL LIGHT IS CLOSE.

## 2021-02-11 NOTE — NUR
MED PASS + ASSESSMENT + DC PREP
pt assessment completed, VSS, pt reports 4/10 tolerable pain and no nausea at
this time. pt able to eat 100% of her breakfast. pt able to take all meds
without difficulty. pt was able to have BM last night and "feels better now".
pt anticipating DC to [place] this morning. pt has sensation of pain 4/10 deep
into her right hip, but does not have sensation on the surface of her right
upper leg. pt dressing CDI. pt finishing her coffee now, table and call light
within reach.